# Patient Record
Sex: MALE | Race: WHITE
[De-identification: names, ages, dates, MRNs, and addresses within clinical notes are randomized per-mention and may not be internally consistent; named-entity substitution may affect disease eponyms.]

---

## 2018-05-29 ENCOUNTER — HOSPITAL ENCOUNTER (EMERGENCY)
Dept: HOSPITAL 7 - FB.ED | Age: 28
Discharge: HOME | End: 2018-05-29
Payer: COMMERCIAL

## 2018-05-29 DIAGNOSIS — E86.0: ICD-10-CM

## 2018-05-29 DIAGNOSIS — A04.72: Primary | ICD-10-CM

## 2018-05-29 DIAGNOSIS — F17.210: ICD-10-CM

## 2018-05-29 PROCEDURE — 96361 HYDRATE IV INFUSION ADD-ON: CPT

## 2018-05-29 PROCEDURE — 80053 COMPREHEN METABOLIC PANEL: CPT

## 2018-05-29 PROCEDURE — 74177 CT ABD & PELVIS W/CONTRAST: CPT

## 2018-05-29 PROCEDURE — 86140 C-REACTIVE PROTEIN: CPT

## 2018-05-29 PROCEDURE — 87040 BLOOD CULTURE FOR BACTERIA: CPT

## 2018-05-29 PROCEDURE — 96374 THER/PROPH/DIAG INJ IV PUSH: CPT

## 2018-05-29 PROCEDURE — 87427 SHIGA-LIKE TOXIN AG IA: CPT

## 2018-05-29 PROCEDURE — 82272 OCCULT BLD FECES 1-3 TESTS: CPT

## 2018-05-29 PROCEDURE — 83605 ASSAY OF LACTIC ACID: CPT

## 2018-05-29 PROCEDURE — 36415 COLL VENOUS BLD VENIPUNCTURE: CPT

## 2018-05-29 PROCEDURE — 87324 CLOSTRIDIUM AG IA: CPT

## 2018-05-29 PROCEDURE — 87046 STOOL CULTR AEROBIC BACT EA: CPT

## 2018-05-29 PROCEDURE — 85025 COMPLETE CBC W/AUTO DIFF WBC: CPT

## 2018-05-29 PROCEDURE — 99284 EMERGENCY DEPT VISIT MOD MDM: CPT

## 2018-05-29 PROCEDURE — 81001 URINALYSIS AUTO W/SCOPE: CPT

## 2018-05-29 PROCEDURE — 82150 ASSAY OF AMYLASE: CPT

## 2018-05-29 PROCEDURE — 87045 FECES CULTURE AEROBIC BACT: CPT

## 2018-05-29 NOTE — EDM.PDOC
ED HPI GENERAL MEDICAL PROBLEM





- General


Chief Complaint: Abdominal Pain


Stated Complaint: ABD PAIN,DIARRHEA


Time Seen by Provider: 05/29/18 07:00


Source of Information: Reports: Patient


History Limitations: Reports: No Limitations





- History of Present Illness


INITIAL COMMENTS - FREE TEXT/NARRATIVE: 





27 y.o.w.m came this morning to the ed due to N/V/D and abd pain, labs, tx  and 

xrays were ordered by Dr. Gomez. Pt felt better after he received NS. Lab 

showed C Diff CT Abd, showed colitis. No other acute mediucal issues. /87 

pulse 87 Temp 37.3 Pulse ox 100% RR 15


Onset Date: 05/26/18


Onset Time: 08:00


Duration: Day(s):, Getting Worse, Intermittent


Location: Reports: Abdomen


Quality: Reports: Ache, Burning, Dull


Severity: Moderate


Improves with: Reports: Medication


Worsens with: Reports: Other


Context: Reports: Other


Associated Symptoms: Reports: Nausea/Vomiting, Other (diarrhea, abd. pain)


  ** abdomen


Pain Score (Numeric/FACES): 9





- Related Data


 Allergies











Allergy/AdvReac Type Severity Reaction Status Date / Time


 


No Known Allergies Allergy   Verified 05/29/18 03:02











Home Meds: 


 Home Meds





Ondansetron [Zofran ODT] 8 mg PO Q6H PRN #20 tab.dis 05/29/18 [Rx]


metroNIDAZOLE [Flagyl] 500 mg PO Q8H #30 tab 05/29/18 [Rx]











Social & Family History





- Family History


Family Medical History: Noncontributory





- Tobacco Use


Smoking Status *Q: Current Every Day Smoker


Years of Tobacco use: 4


Packs/Tins Daily: 0.5





- Caffeine Use


Caffeine Use: Reports: Soda





- Recreational Drug Use


Recreational Drug Use: No





ED ROS GENERAL





- Review of Systems


Review Of Systems: See Below


Constitutional: Reports: No Symptoms


HEENT: Reports: No Symptoms


Respiratory: Reports: No Symptoms


Cardiovascular: Reports: No Symptoms


Endocrine: Reports: No Symptoms


GI/Abdominal: Reports: Abdominal Pain, Diarrhea, Nausea


: Reports: No Symptoms


Musculoskeletal: Reports: No Symptoms


Skin: Reports: No Symptoms


Neurological: Reports: No Symptoms


Psychiatric: Reports: No Symptoms


Hematologic/Lymphatic: Reports: No Symptoms


Immunologic: Reports: No Symptoms





ED EXAM, GI/ABD





- Physical Exam


Exam: See Below


Exam Limited By: No Limitations


General Appearance: Alert, WD/WN, Moderate Distress


Eyes: Bilateral: Normal Appearance


Ears: Normal External Exam


Nose: Normal Inspection


Throat/Mouth: Normal Inspection


Head: Atraumatic, Normocephalic


Neck: Normal Inspection, Supple, Non-Tender, Full Range of Motion


Respiratory/Chest: No Respiratory Distress, Lungs Clear, Normal Breath Sounds, 

No Accessory Muscle Use, Chest Non-Tender


Cardiovascular: Normal Peripheral Pulses, Regular Rate, Rhythm, No Edema, No 

Gallop, No JVD, No Murmur, No Rub


GI/Abdominal Exam: Tender (R and l lower abd. ), Abnormal Bowel Sounds


 (Male) Exam: No Hernia


Rectal (Males) Exam: Deferred


Back Exam: Normal Inspection, Full Range of Motion


Extremities: Normal Inspection, Normal Range of Motion, Non-Tender, No Pedal 

Edema, Normal Capillary Refill


Neurological: Alert, Oriented, CN II-XII Intact, Normal Cognition, Normal Gait, 

No Motor/Sensory Deficits


Psychiatric: Normal Affect, Normal Mood


Skin Exam: Warm, Dry, Intact, Normal Color, No Rash


Lymphatic: No Adenopathy





Course





- Vital Signs


Text/Narrative:: 


27 y.o.w.m came this morning to the ed due to N/V/D and abd pain, labs, tx  and 

xrays were ordered by Dr. Gomez. Pt felt better after he received NS. Lab 

showed C Diff CT Abd, showed colitis. No other acute mediucal issues. /87 

pulse 87 Temp 37.3 Pulse ox 100% RR 15


PE: WNWD W M came to the ed at 3.30 am srivastava N/V and abd. pain


Labs: CBC nl BASIC lab was nl UA was nl CRP was 4.4


Imaging: Mesentaric lymphnods, Colitis with thickening of the colonic wall 

thickening as per RAD


Impression: C Diff of colon with enteritis and diarrhea , Dehydration


Tx: Zofran, Flagyl, NS


Reexam: Improved. was able to keep after down well, 2 BM since e in the ed


Plan: D/C with instructions


Last Recorded V/S: 


 Last Vital Signs











Temp  37.1 C   05/29/18 06:56


 


Pulse  97   05/29/18 06:56


 


Resp  15   05/29/18 06:56


 


BP  93/58 L  05/29/18 06:56


 


Pulse Ox  97   05/29/18 06:56














- Orders/Labs/Meds


Orders: 


 Active Orders 24 hr











 Category Date Time Status


 


 Abdomen Pelvis w Cont [CT] Stat Exams  05/29/18 03:06 Taken


 


 CDIFF TOXIN A+B GROUP [OP] Stat Lab  05/29/18 06:40 Results


 


 CULTURE BLOOD [BC] Urgent Lab  05/29/18 05:40 Received


 


 CULTURE BLOOD [BC] Urgent Lab  05/29/18 05:45 Received


 


 OCCULT BLOOD DIAGNOSTIC [OP] Stat Lab  05/29/18 06:40 Results


 


 STOOL CULTURE Stat Lab  05/29/18 06:40 Received


 


 URINALYSIS W/MICROSCOPIC [UA W/MICROSCOPIC] [URIN] Stat Lab  05/29/18 04:36 

Ordered


 


 Blood Culture x2 Reflex Set [OM.PC] Urgent Oth  05/29/18 05:21 Ordered


 


 Peripheral IV Insertion Adult [OM.PC] Routine Oth  05/29/18 03:11 Ordered











Labs: 


 Laboratory Tests











  05/29/18 05/29/18 05/29/18 Range/Units





  03:15 03:15 03:15 


 


WBC  11.0    (4.5-12.0)  X10-3/uL


 


RBC  5.48    (4.30-5.75)  x10(6)uL


 


Hgb  16.0 H    (11.5-15.5)  g/dL


 


Hct  47.3    (30.0-51.3)  %


 


MCV  86.3    (80-96)  fL


 


MCH  29.2    (27.7-33.6)  pg


 


MCHC  33.8    (32.2-35.4)  g/dL


 


RDW  12.9    (11.5-15.5)  %


 


Plt Count  164    (125-369)  X10(3)uL


 


MPV  10.0    (7.4-10.4)  fL


 


Add Manual Diff  Yes    


 


Neutrophils % (Manual)  68    (46-82)  %


 


Band Neutrophils %  8 H    (0-6)  %


 


Lymphocytes % (Manual)  15    (13-37)  %


 


Monocytes % (Manual)  9    (4-12)  %


 


Sodium   141   (135-145)  mmol/L


 


Potassium   3.4 L   (3.5-5.3)  mmol/L


 


Chloride   103   (100-110)  mmol/L


 


Carbon Dioxide   27   (21-32)  mmol/L


 


BUN   12   (7-18)  mg/dL


 


Creatinine   1.1   (0.70-1.30)  mg/dL


 


Est Cr Clr Drug Dosing   109.34   mL/min


 


Estimated GFR (MDRD)   > 60   (>60)  


 


BUN/Creatinine Ratio   10.9   (9-20)  


 


Glucose   105   ()  mg/dL


 


Lactic Acid     (0.4-2.2)  mmol/L


 


Calcium   8.8   (8.6-10.2)  mg/dL


 


Total Bilirubin   0.4   (0.1-1.3)  mg/dL


 


AST   21   (5-25)  IU/L


 


ALT   21   (12-36)  U/L


 


Alkaline Phosphatase   68   ()  IU/L


 


C-Reactive Protein     (0.5-0.9)  mg/dL


 


Total Protein   6.8   (6.0-8.0)  g/dL


 


Albumin   3.4 L   (3.5-5.2)  g/dL


 


Globulin   3.4   g/dL


 


Albumin/Globulin Ratio   1.0   


 


Amylase    25  ()  U/L


 


Urine Color     (YELLOW)  


 


Urine Appearance     (CLEAR)  


 


Urine pH     (5.0-6.5)  


 


Ur Specific Gravity     (1.010-1.025)  


 


Urine Protein     (NEGATIVE)  mg/dL


 


Urine Glucose (UA)     (NEGATIVE)  mg/dL


 


Urine Ketones     (NEGATIVE)  mg/dL


 


Urine Occult Blood     (NEGATIVE)  


 


Urine Nitrite     (NEGATIVE)  


 


Urine Bilirubin     (NEGATIVE)  


 


Urine Urobilinogen     (NEGATIVE)  mg/dL


 


Ur Leukocyte Esterase     (NEGATIVE)  


 


Urine RBC     (0)  


 


Urine WBC     (0)  


 


Ur Squamous Epith Cells     (NS,R,O)  


 


Urine Bacteria     (NS)  














  05/29/18 05/29/18 05/29/18 Range/Units





  04:36 05:40 05:40 


 


WBC     (4.5-12.0)  X10-3/uL


 


RBC     (4.30-5.75)  x10(6)uL


 


Hgb     (11.5-15.5)  g/dL


 


Hct     (30.0-51.3)  %


 


MCV     (80-96)  fL


 


MCH     (27.7-33.6)  pg


 


MCHC     (32.2-35.4)  g/dL


 


RDW     (11.5-15.5)  %


 


Plt Count     (125-369)  X10(3)uL


 


MPV     (7.4-10.4)  fL


 


Add Manual Diff     


 


Neutrophils % (Manual)     (46-82)  %


 


Band Neutrophils %     (0-6)  %


 


Lymphocytes % (Manual)     (13-37)  %


 


Monocytes % (Manual)     (4-12)  %


 


Sodium     (135-145)  mmol/L


 


Potassium     (3.5-5.3)  mmol/L


 


Chloride     (100-110)  mmol/L


 


Carbon Dioxide     (21-32)  mmol/L


 


BUN     (7-18)  mg/dL


 


Creatinine     (0.70-1.30)  mg/dL


 


Est Cr Clr Drug Dosing     mL/min


 


Estimated GFR (MDRD)     (>60)  


 


BUN/Creatinine Ratio     (9-20)  


 


Glucose     ()  mg/dL


 


Lactic Acid    0.6  (0.4-2.2)  mmol/L


 


Calcium     (8.6-10.2)  mg/dL


 


Total Bilirubin     (0.1-1.3)  mg/dL


 


AST     (5-25)  IU/L


 


ALT     (12-36)  U/L


 


Alkaline Phosphatase     ()  IU/L


 


C-Reactive Protein   4.4 H*   (0.5-0.9)  mg/dL


 


Total Protein     (6.0-8.0)  g/dL


 


Albumin     (3.5-5.2)  g/dL


 


Globulin     g/dL


 


Albumin/Globulin Ratio     


 


Amylase     ()  U/L


 


Urine Color  Yellow    (YELLOW)  


 


Urine Appearance  Clear    (CLEAR)  


 


Urine pH  6.0    (5.0-6.5)  


 


Ur Specific Gravity  1.015    (1.010-1.025)  


 


Urine Protein  Negative    (NEGATIVE)  mg/dL


 


Urine Glucose (UA)  Normal    (NEGATIVE)  mg/dL


 


Urine Ketones  Negative    (NEGATIVE)  mg/dL


 


Urine Occult Blood  Negative    (NEGATIVE)  


 


Urine Nitrite  Negative    (NEGATIVE)  


 


Urine Bilirubin  Negative    (NEGATIVE)  


 


Urine Urobilinogen  Normal    (NEGATIVE)  mg/dL


 


Ur Leukocyte Esterase  Negative    (NEGATIVE)  


 


Urine RBC  0-5    (0)  


 


Urine WBC  0-5    (0)  


 


Ur Squamous Epith Cells  Occasional    (NS,R,O)  


 


Urine Bacteria  Few H    (NS)  











Meds: 


Medications














Discontinued Medications














Generic Name Dose Route Start Last Admin





  Trade Name Freq  PRN Reason Stop Dose Admin


 


Acetaminophen  650 mg  05/29/18 05:25  05/29/18 05:33





  Tylenol  PO  05/29/18 05:26  650 mg





  NOW ONE   Administration





     





     





     





     


 


Sodium Chloride  1,000 mls @ 999 mls/hr  05/29/18 03:15  05/29/18 03:26





  Normal Saline  IV   999 mls/hr





  ASDIRECTED TICO   Administration





     





     





     





     


 


Sodium Chloride  1,000 mls @ 999 mls/hr  05/29/18 05:30  05/29/18 05:35





  Normal Saline  IV   999 mls/hr





  ASDIRECTED TICO   Administration





     





     





     





     


 


Iopamidol  100 ml  05/29/18 03:23  05/29/18 03:38





  Isovue-370 (76%)  IV  05/29/18 03:24  100 ml





  .AS DIRECTED ONE   Administration





     





     





     





     


 


Metronidazole  500 mg  05/29/18 07:59  05/29/18 08:15





  Flagyl  PO  05/29/18 08:00  500 mg





  ONETIME ONE   Administration





     





     





     





     


 


Ondansetron HCl  8 mg  05/29/18 03:25  05/29/18 03:28





  Zofran  IVPUSH  05/29/18 03:26  8 mg





  ONETIME ONE   Administration





     





     





     





     


 


Sodium Chloride  10 ml  05/29/18 03:11  





  Saline Flush  FLUSH   





  ASDIRECTED PRN   





  Keep Vein Open   





     





     





     














Departure





- Departure


Time of Disposition: 07:56


Disposition: Home, Self-Care 01


Condition: Good


Clinical Impression: 


 Dehydration, C. difficile colitis, C. difficile enteritis, C. difficile 

diarrhea








- Discharge Information


Prescriptions: 


metroNIDAZOLE [Flagyl] 500 mg PO Q8H #30 tab


Ondansetron [Zofran ODT] 8 mg PO Q6H PRN #20 tab.dis


 PRN Reason: nausea, vomiting


Instructions:  Ondansetron tablets, Dehydration, Adult, Easy-to-Read, 

Clostridium Difficile Infection, Metronidazole tablets or capsules


Referrals: 


Inna Mcelroy NP [Primary Care Provider] - 


Forms:  ED Department Discharge, ED Return to Work/School Form


Additional Instructions: 


Please take the meds as recommended, please increase water intake, please f/u 

with your PMD in next 2 days. come back to the ed if your symptoms get worse 

acutely